# Patient Record
Sex: FEMALE | Race: OTHER | NOT HISPANIC OR LATINO | URBAN - METROPOLITAN AREA
[De-identification: names, ages, dates, MRNs, and addresses within clinical notes are randomized per-mention and may not be internally consistent; named-entity substitution may affect disease eponyms.]

---

## 2017-07-17 ENCOUNTER — APPOINTMENT (RX ONLY)
Dept: URBAN - METROPOLITAN AREA CLINIC 50 | Facility: CLINIC | Age: 27
Setting detail: DERMATOLOGY
End: 2017-07-17

## 2017-07-17 DIAGNOSIS — L70.0 ACNE VULGARIS: ICD-10-CM

## 2017-07-17 DIAGNOSIS — L81.4 OTHER MELANIN HYPERPIGMENTATION: ICD-10-CM

## 2017-07-17 DIAGNOSIS — D22 MELANOCYTIC NEVI: ICD-10-CM

## 2017-07-17 PROBLEM — D22.9 MELANOCYTIC NEVI, UNSPECIFIED: Status: ACTIVE | Noted: 2017-07-17

## 2017-07-17 PROCEDURE — ? PRESCRIPTION

## 2017-07-17 PROCEDURE — 99214 OFFICE O/P EST MOD 30 MIN: CPT

## 2017-07-17 PROCEDURE — ? COUNSELING

## 2017-07-17 RX ORDER — MINOCYCLINE HYDROCHLORIDE 50 MG/1
CAPSULE, COATED PELLETS ORAL BID
Qty: 60 | Refills: 0 | Status: ERX | COMMUNITY
Start: 2017-07-17

## 2017-07-17 RX ADMIN — MINOCYCLINE HYDROCHLORIDE: 50 CAPSULE, COATED PELLETS ORAL at 00:00

## 2017-07-17 ASSESSMENT — SEVERITY ASSESSMENT OVERALL AMONG ALL PATIENTS
IN YOUR EXPERIENCE, AMONG ALL PATIENTS YOU HAVE SEEN WITH THIS CONDITION, HOW SEVERE IS THIS PATIENT'S CONDITION?: INFLAMMATORY LESIONS MORE APPARENT; MANY COMEDONES AND PAPULES/PUSTULES, +/- FEW NODULOCYSTIC LESIONS

## 2017-07-17 NOTE — PROCEDURE: MIPS QUALITY
Quality 131: Pain Assessment And Follow-Up: Pain assessment using a standardized tool is documented as negative, no follow-up plan required
Quality 110: Preventive Care And Screening: Influenza Immunization: Influenza Immunization Ordered or Recommended, but not Administered due to system reason
Quality 431: Preventive Care And Screening: Unhealthy Alcohol Use - Screening: Patient screened for unhealthy alcohol use using a single question and scores less than 2 times per year
Quality 226: Preventive Care And Screening: Tobacco Use: Screening And Cessation Intervention: Patient screened for tobacco and never smoked
Detail Level: Zone

## 2017-07-31 ENCOUNTER — APPOINTMENT (RX ONLY)
Dept: URBAN - METROPOLITAN AREA CLINIC 50 | Facility: CLINIC | Age: 27
Setting detail: DERMATOLOGY
End: 2017-07-31

## 2017-07-31 DIAGNOSIS — L70.0 ACNE VULGARIS: ICD-10-CM

## 2017-07-31 PROCEDURE — ? PRESCRIPTION

## 2017-07-31 PROCEDURE — ? COUNSELING

## 2017-07-31 PROCEDURE — 99213 OFFICE O/P EST LOW 20 MIN: CPT

## 2017-07-31 RX ORDER — CLINDAMYCIN PHOS/BENZOYL PEROX 1.2(1)%-5%
GEL (GRAM) TOPICAL BID
Qty: 1 | Refills: 2 | Status: ERX | COMMUNITY
Start: 2017-07-31

## 2017-07-31 RX ORDER — SPIRONOLACTONE 50 MG/1
TABLET, FILM COATED ORAL
Qty: 60 | Refills: 2 | Status: ERX

## 2017-07-31 RX ADMIN — Medication: at 00:00

## 2017-07-31 ASSESSMENT — LOCATION SIMPLE DESCRIPTION DERM: LOCATION SIMPLE: RIGHT FOREHEAD

## 2017-07-31 ASSESSMENT — LOCATION ZONE DERM: LOCATION ZONE: FACE

## 2017-07-31 ASSESSMENT — LOCATION DETAILED DESCRIPTION DERM: LOCATION DETAILED: RIGHT MEDIAL FOREHEAD

## 2017-07-31 ASSESSMENT — SEVERITY ASSESSMENT OVERALL AMONG ALL PATIENTS
IN YOUR EXPERIENCE, AMONG ALL PATIENTS YOU HAVE SEEN WITH THIS CONDITION, HOW SEVERE IS THIS PATIENT'S CONDITION?: MULTIPLE INFLAMMATORY LESIONS BUT NONINFLAMMATORY LESIONS PREDOMINATE

## 2017-10-23 ENCOUNTER — APPOINTMENT (RX ONLY)
Dept: URBAN - METROPOLITAN AREA CLINIC 50 | Facility: CLINIC | Age: 27
Setting detail: DERMATOLOGY
End: 2017-10-23

## 2017-10-23 DIAGNOSIS — L70.0 ACNE VULGARIS: ICD-10-CM

## 2017-10-23 DIAGNOSIS — D22 MELANOCYTIC NEVI: ICD-10-CM

## 2017-10-23 PROBLEM — D22.9 MELANOCYTIC NEVI, UNSPECIFIED: Status: ACTIVE | Noted: 2017-10-23

## 2017-10-23 PROCEDURE — 99214 OFFICE O/P EST MOD 30 MIN: CPT

## 2017-10-23 PROCEDURE — ? PRESCRIPTION

## 2017-10-23 PROCEDURE — ? COUNSELING

## 2017-10-23 RX ORDER — SPIRONOLACTONE 100 MG/1
TABLET, FILM COATED ORAL
Qty: 60 | Refills: 0 | Status: ERX | COMMUNITY
Start: 2017-10-23

## 2017-10-23 RX ADMIN — SPIRONOLACTONE: 100 TABLET, FILM COATED ORAL at 23:11

## 2017-10-23 ASSESSMENT — LOCATION SIMPLE DESCRIPTION DERM
LOCATION SIMPLE: RIGHT CHEEK
LOCATION SIMPLE: LEFT CHEEK

## 2017-10-23 ASSESSMENT — LOCATION DETAILED DESCRIPTION DERM
LOCATION DETAILED: RIGHT CENTRAL MALAR CHEEK
LOCATION DETAILED: LEFT INFERIOR CENTRAL MALAR CHEEK

## 2017-10-23 ASSESSMENT — LOCATION ZONE DERM: LOCATION ZONE: FACE
